# Patient Record
Sex: FEMALE | Race: BLACK OR AFRICAN AMERICAN | Employment: OTHER | URBAN - METROPOLITAN AREA
[De-identification: names, ages, dates, MRNs, and addresses within clinical notes are randomized per-mention and may not be internally consistent; named-entity substitution may affect disease eponyms.]

---

## 2018-02-23 ENCOUNTER — OFFICE VISIT (OUTPATIENT)
Dept: CARDIOLOGY CLINIC | Age: 73
End: 2018-02-23

## 2018-02-23 VITALS
WEIGHT: 167 LBS | BODY MASS INDEX: 29.59 KG/M2 | DIASTOLIC BLOOD PRESSURE: 100 MMHG | OXYGEN SATURATION: 98 % | SYSTOLIC BLOOD PRESSURE: 160 MMHG | HEART RATE: 84 BPM | HEIGHT: 63 IN

## 2018-02-23 DIAGNOSIS — R00.2 PALPITATIONS: ICD-10-CM

## 2018-02-23 DIAGNOSIS — R94.31 ABNORMAL EKG: Primary | ICD-10-CM

## 2018-02-23 DIAGNOSIS — E11.9 CONTROLLED TYPE 2 DIABETES MELLITUS WITHOUT COMPLICATION, WITHOUT LONG-TERM CURRENT USE OF INSULIN (HCC): ICD-10-CM

## 2018-02-23 RX ORDER — ATORVASTATIN CALCIUM 10 MG/1
10 TABLET, FILM COATED ORAL 3 TIMES WEEKLY
COMMUNITY
End: 2019-01-16 | Stop reason: ALTCHOICE

## 2018-02-23 RX ORDER — CHOLECALCIFEROL (VITAMIN D3) 125 MCG
2000 CAPSULE ORAL DAILY
COMMUNITY

## 2018-02-23 NOTE — PROGRESS NOTES
1. Have you been to the ER, urgent care clinic since your last visit? Hospitalized since your last visit? No     2. Have you seen or consulted any other health care providers outside of the [address] [place] [address] since your last visit? Include any pap smears or colon screening.  No

## 2018-02-23 NOTE — MR AVS SNAPSHOT
[address] [place] [address] [address] [address] [phone] Patient: Maggy Neville MRN: [id number] Visit Information Date & Time Provider Department Dept. Phone Encounter #  
 2/23/2018 10:00 AM Srinivas Scanlon MD [facility] [address] [id number] Upcoming Health Maintenance Date Due Hepatitis C Screening 1945 DTaP/Tdap/Td series (1 - Tdap) 9/14/1968 BREAST CANCER SCRN MAMMOGRAM 9/14/1997 FOBT Q 1 YEAR AGE 50-75 9/14/1997 ZOSTER VACCINE AGE 60> 7/14/2007 GLAUCOMA SCREENING Q2Y 9/14/2012 OSTEOPOROSIS SCREENING (DEXA) 9/14/2012 Pneumococcal 65+ Low/Medium Risk (1 of 2 - PCV13) 9/14/2012 MEDICARE YEARLY EXAM 9/14/2012 Influenza Age 5 to Adult 8/1/2017 Allergies as of 2/23/2018  Review Complete On: 2/5/2016 By: Prabhjot Piedra RN No Known Allergies Current Immunizations  Never Reviewed No immunizations on file. Not reviewed this visit You Were Diagnosed With   
  
 Codes Comments Palpitations    -  Primary ICD-10-CM: R00.2 ICD-9-CM: 785.1 Abnormal EKG     ICD-10-CM: R94.31 
ICD-9-CM: 794.31 Vitals BP Pulse Height(growth percentile) Weight(growth percentile) SpO2 BMI  
 (!) 160/100 84 5' 2.5\" (1.588 m) 167 lb (75.8 kg) 98% 30.06 kg/m2 OB Status Smoking Status Postmenopausal Unknown If Ever Smoked Vitals History BMI and BSA Data Body Mass Index Body Surface Area 30.06 kg/m 2 1.83 m 2 Your Updated Medication List  
  
   
This list is accurate as of 2/23/18 10:43 AM.  Always use your most recent med list.  
  
  
  
  
 LIPITOR 10 mg tablet Generic drug:  atorvastatin Take 10 mg by mouth daily. metFORMIN 500 mg tablet Commonly known as:  GLUCOPHAGE Take 250 mg by mouth two (2) times daily (with meals). VITAMIN D3 2,000 unit Tab Generic drug:  cholecalciferol (vitamin D3) Take 2,000 Units by mouth daily. We Performed the Following AMB POC EKG ROUTINE W/ 12 LEADS, INTER & REP [59198 CPT(R)] To-Do List   
 02/23/2018 ECHO:  2D ECHO COMPLETE ADULT (TTE) W OR WO CONTR Introducing [facility] & HE[facility] SERVICES! Brenda Robb introduces [employer] patient portal. Now you can access parts of your medical record, email your doctor's office, and request medication refills online. 1. In your internet browser, go to [email or web address] 2. Click on the First Time User? Click Here link in the Sign In box. You will see the New Member Sign Up page. 3. Enter your [employer] Access Code exactly as it appears below. You will not need to use this code after youve completed the sign-up process. If you do not sign up before the expiration date, you must request a new code. · [employer] Access Code: [email or web address] Expires: 5/24/2018  9:43 AM 
 
4. Enter the last four digits of your Social Security Number (xxxx) and Date of Birth (mm/dd/yyyy) as indicated and click Submit. You will be taken to the next sign-up page. 5. Create a [employer] ID. This will be your [employer] login ID and cannot be changed, so think of one that is secure and easy to remember. 6. Create a [employer] password. You can change your password at any time. 7. Enter your Password Reset Question and Answer. This can be used at a later time if you forget your password. 8. Enter your e-mail address. You will receive e-mail notification when new information is available in [address] 9. Click Sign Up. You can now view and download portions of your medical record. 10. Click the Download Summary menu link to download a portable copy of your medical information. If you have questions, please visit the Frequently Asked Questions section of the [employer] website. Remember, [employer] is NOT to be used for urgent needs. For medical emergencies, dial 911. Now available from your iPhone and Android! Please provide this summary of care documentation to your next provider. Your primary care clinician is listed as Halina Martin. If you have any questions after today's visit, please call [phone].

## 2018-02-23 NOTE — PROGRESS NOTES
HISTORY OF PRESENT ILLNESS  Nedra Monge is a 81 y.o. female. HPI    Patient presents for annual office visit. She was referred here by her PCP for evaluation of an abnormal EKG. She has a long-standing history of diabetes mellitus, type II, borderline hypertension and borderline dyslipidemia. She has been managed with oral agents for diabetes and was recently started on a low-dose statin, primarily because of her risk factors. She denies ever having any chest pain or shortness of breath. She does notice an occasional palpitation which feels like a skipped heartbeat now and again. She states she was diagnosed with PVCs in the past.  She tries to exercise at least 4-5 times a week doing different cardiovascular activities. She has never noted any exertional chest pain or shortness of breath. No major change in her activity tolerance. No dizziness or syncope. Past Medical History:   Diagnosis Date    Diabetes ([facility] [address]     Oral agents     Obesity, unspecified     Patient education: Exercise to stay healthy; physical activity Patient Education; Fats in the Diet; Good and bad recommendations for a healthy diet*: calories    Other specified cardiac dysrhythmias(427.89)     Palpitations     H/o PVCs in remote past    Undiagnosed cardiac murmurs      Current Outpatient Prescriptions   Medication Sig Dispense Refill    cholecalciferol, vitamin D3, (VITAMIN D3) 2,000 unit tab Take 2,000 Units by mouth daily.  atorvastatin (LIPITOR) 10 mg tablet Take 10 mg by mouth daily.  metFORMIN (GLUCOPHAGE) 500 mg tablet Take 250 mg by mouth two (2) times daily (with meals).        No Known Allergies     Social History   Substance Use Topics    Smoking status: Unknown If Ever Smoked    Smokeless tobacco: None    Alcohol use No     Family History   Problem Relation Age of Onset    Heart Disease Mother     Arthritis-osteo Mother     Cancer Father     Dementia Father     Heart Disease Father     Diabetes Sister     Heart Disease Sister     Diabetes Brother          Review of Systems   Constitutional: Negative for chills, fever and weight loss. HENT: Negative for nosebleeds. Eyes: Negative for blurred vision and double vision. Respiratory: Negative for cough, shortness of breath and wheezing. Cardiovascular: Positive for palpitations. Negative for chest pain, orthopnea, claudication, leg swelling and PND. Gastrointestinal: Negative for abdominal pain, heartburn, nausea and vomiting. Genitourinary: Negative for dysuria and hematuria. Musculoskeletal: Negative for falls and myalgias. Skin: Negative for rash. Neurological: Negative for dizziness, focal weakness and headaches. Endo/Heme/Allergies: Does not bruise/bleed easily. Psychiatric/Behavioral: Negative for substance abuse. Visit Vitals    BP (!) 160/100    Pulse 84    Ht 5' 2.5\" (1.588 m)    Wt 75.8 kg (167 lb)    SpO2 98%    BMI 30.06 kg/m2       Physical Exam   Constitutional: She is oriented to person, place, and time. She appears well-developed and well-nourished. HENT:   Head: Normocephalic and atraumatic. Eyes: Conjunctivae are normal.   Neck: Neck supple. No JVD present. Carotid bruit is not present. Cardiovascular: Normal rate, regular rhythm, S1 normal, S2 normal and normal pulses. Exam reveals no gallop. No murmur heard. Pulmonary/Chest: Effort normal and breath sounds normal. She has no wheezes. She has no rales. Abdominal: Soft. Bowel sounds are normal. There is no tenderness. Musculoskeletal: She exhibits no edema, tenderness or deformity. Neurological: She is alert and oriented to person, place, and time. Skin: Skin is warm and dry. Psychiatric: She has a normal mood and affect.  Her behavior is normal. Thought content normal.     EKG: Normal sinus rhythm, normal axis, borderline voltage criteria for LVH, normal QTc interval, borderline anteroseptal Q waves, cannot exclude prior infarct. No ST or T-wave abnormalities concerning for ischemia. No change compared to her previous EKG. ASSESSMENT and PLAN    Abnormal EKG. Patient does have borderline anteroseptal Q waves present on today's EKG and her prior EKG. This can represent a prior infarct, but often is related to a patient's body habitus. I have recommended an echocardiogram for further evaluation of her LV function and assess for any regional wall motion abnormalities. If this is unremarkable, no further cardiac workup is needed. Borderline hypertension. Patient has not taken any blood pressure medications. Her blood pressure is elevated today in the office, however, she states it is usually well controlled without medications. I have asked her to start checking this regularly. Diabetes mellitus, type II. Patient has been treated with oral agents. She states her hemoglobin A1c is always been less than 7. Borderline dyslipidemia. Patient had a lipid panel done in February 2018: Total cholesterol 209, HDL 93, LDL 98. These numbers are very reasonable, but given her long-standing diabetes, a statin is not unreasonable. Heart palpitations. These have been unchanged for several years. I suspect the patient is having either isolated PVCs or PACs. As well as her echocardiogram shows no significant structural heart disease, no further workup needed. Follow-up in 6 months, sooner if needed.

## 2018-03-02 ENCOUNTER — HOSPITAL ENCOUNTER (OUTPATIENT)
Dept: NON INVASIVE DIAGNOSTICS | Age: 73
Discharge: HOME | End: 2018-03-02
Attending: INTERNAL MEDICINE
Payer: MEDICARE

## 2018-03-02 DIAGNOSIS — R94.31 ABNORMAL EKG: ICD-10-CM

## 2018-03-02 DIAGNOSIS — R00.2 PALPITATIONS: ICD-10-CM

## 2018-03-02 PROCEDURE — 93306 TTE W/DOPPLER COMPLETE: CPT

## 2018-03-06 NOTE — PROGRESS NOTES
Per your last note \" Abnormal EKG. Patient does have borderline anteroseptal Q waves present on today's EKG and her prior EKG. This can represent a prior infarct, but often is related to a patient's body habitus. I have recommended an echocardiogram for further evaluation of her LV function and assess for any regional wall motion abnormalities. If this is unremarkable, no further cardiac workup is needed.

## 2018-03-12 ENCOUNTER — TELEPHONE (OUTPATIENT)
Dept: CARDIOLOGY CLINIC | Age: 73
End: 2018-03-12

## 2018-03-12 NOTE — TELEPHONE ENCOUNTER
Called patient. Verified  and full name. Informed about results per Dr Braxton Sauceda. Patient verbalized understanding and agreed with the plan of care. She is concerned for her BP.  Scheduled for her to come in and check BP along with comparing to her BP device

## 2018-03-12 NOTE — TELEPHONE ENCOUNTER
----- Message from Tiera Dasilva MD sent at 3/12/2018  9:43 AM EDT -----  Please let the patient know that her echocardiogram was unremarkable.  ----- Message -----     From: Deepak English RN     Sent: 3/6/2018   8:00 AM       To: Tiera Dasilva MD    Per your last note \" Abnormal EKG. Patient does have borderline anteroseptal Q waves present on today's EKG and her prior EKG. This can represent a prior infarct, but often is related to a patient's body habitus. I have recommended an echocardiogram for further evaluation of her LV function and assess for any regional wall motion abnormalities. If this is unremarkable, no further cardiac workup is needed.

## 2018-03-26 ENCOUNTER — CLINICAL SUPPORT (OUTPATIENT)
Dept: CARDIOLOGY CLINIC | Age: 73
End: 2018-03-26

## 2018-03-26 VITALS
BODY MASS INDEX: 29.59 KG/M2 | WEIGHT: 167 LBS | SYSTOLIC BLOOD PRESSURE: 130 MMHG | OXYGEN SATURATION: 98 % | DIASTOLIC BLOOD PRESSURE: 72 MMHG | HEART RATE: 68 BPM | HEIGHT: 63 IN

## 2018-03-26 DIAGNOSIS — R00.2 PALPITATIONS: Primary | ICD-10-CM

## 2018-03-26 NOTE — PROGRESS NOTES
Allison Rich is a 81 y.o. female that is here for a blood pressure check. Her current medications are listed below. Current Outpatient Prescriptions   Medication Sig    cholecalciferol, vitamin D3, (VITAMIN D3) 2,000 unit tab Take 2,000 Units by mouth daily.  metFORMIN (GLUCOPHAGE) 500 mg tablet Take 250 mg by mouth two (2) times daily (with meals).  atorvastatin (LIPITOR) 10 mg tablet Take 10 mg by mouth daily. No current facility-administered medications for this visit. Her   Visit Vitals    /72    Pulse 68    Ht 5' 2.58\" (1.59 m)    Wt 75.8 kg (167 lb)    SpO2 98%    BMI 29.98 kg/m2         Patient was seen in the office 2/23/2018. Patient had an high blood pressure reading of 160/100 at the time of the office visit. Patient brought her automated BP machine to appt today to compare readings. When taken with automated machine in office, her reading was 153/76. I continued to let Ms. Buck Gomez rest and proceeded to take her blood pressure readings manually. 1st reading 146 /74 HR 68  And second about 15 minutes later at 130/72. Patient denies cardiac symptoms other than palpitations. Patient states she has not started the atorvastatin 10 mg that was prescribed by her pcp due to the information she has read on the side effects. She states she much rather have repeat lipid labs before she starts medication. Discussed importance of low sodium in her daily diet. Patient indicated understanding to all that was discussed. Patient did state she feels that her reading in the office at her last visit was not an accurate one.

## 2018-06-19 ENCOUNTER — OFFICE VISIT (OUTPATIENT)
Dept: CARDIOLOGY CLINIC | Age: 73
End: 2018-06-19

## 2018-06-19 VITALS
HEART RATE: 89 BPM | WEIGHT: 156 LBS | SYSTOLIC BLOOD PRESSURE: 126 MMHG | BODY MASS INDEX: 28.71 KG/M2 | HEIGHT: 62 IN | DIASTOLIC BLOOD PRESSURE: 72 MMHG | OXYGEN SATURATION: 98 %

## 2018-06-19 DIAGNOSIS — E78.5 DYSLIPIDEMIA: ICD-10-CM

## 2018-06-19 DIAGNOSIS — R94.31 ABNORMAL EKG: Primary | ICD-10-CM

## 2018-06-19 DIAGNOSIS — R00.2 PALPITATIONS: ICD-10-CM

## 2018-06-19 NOTE — PROGRESS NOTES
HISTORY OF PRESENT ILLNESS  Scot Mcmahon is a 81 y.o. female. Palpitations    Pertinent negatives include no fever, no chest pain, no claudication, no orthopnea, no PND, no abdominal pain, no nausea, no vomiting, no headaches, no dizziness, no cough and no shortness of breath. Patient presents for a follow-up office visit. She was initially referred here by her PCP for evaluation of an abnormal EKG. She has a long-standing history of diabetes mellitus, type II, borderline hypertension and borderline dyslipidemia. She has been managed with oral agents for diabetes and was recently started on a low-dose statin, primarily because of her risk factors. She does notice an occasional palpitation which feels like a skipped heartbeat now and again. She states she was diagnosed with PVCs in the past.  Patient subsequently underwent an echocardiogram in March 2018 which was essentially normal for her age. EF 86%, grade 1 diastolic dysfunction, mild left atrial enlargement with mildly elevated RV pressures. She was last seen in the office approximately 4 months ago. Since last visit, she continues to have heart palpitations which only last for a second or 2. She notices the sensation of a skipped heartbeat. This appears to be worse under stress or after eating. She has been diagnosed with a hiatal hernia in the past.  She denies any palpitations with exertion. No dyspnea on exertion or chest pain or pressure. She requests stopping her atorvastatin. She states she also recently stopped her metformin at the recommendation of her PCP. Past Medical History:   Diagnosis Date    Diabetes ([facility] [address]     Oral agents     Obesity, unspecified     Patient education: Exercise to stay healthy; physical activity Patient Education;  Fats in the Diet; Good and bad recommendations for a healthy diet*: calories    Other specified cardiac dysrhythmias(427.89)     Palpitations     H/o PVCs in remote past    Undiagnosed cardiac murmurs      Current Outpatient Prescriptions   Medication Sig Dispense Refill    cholecalciferol, vitamin D3, (VITAMIN D3) 2,000 unit tab Take 2,000 Units by mouth daily.  atorvastatin (LIPITOR) 10 mg tablet Take 10 mg by mouth Three (3) times a week. No Known Allergies     Social History   Substance Use Topics    Smoking status: Unknown If Ever Smoked    Smokeless tobacco: None    Alcohol use No     Family History   Problem Relation Age of Onset    Heart Disease Mother     Arthritis-osteo Mother     Cancer Father     Dementia Father     Heart Disease Father     Diabetes Sister     Heart Disease Sister     Diabetes Brother          Review of Systems   Constitutional: Negative for chills, fever and weight loss. HENT: Negative for nosebleeds. Eyes: Negative for blurred vision and double vision. Respiratory: Negative for cough, shortness of breath and wheezing. Cardiovascular: Positive for palpitations. Negative for chest pain, orthopnea, claudication, leg swelling and PND. Gastrointestinal: Negative for abdominal pain, heartburn, nausea and vomiting. Genitourinary: Negative for dysuria and hematuria. Musculoskeletal: Negative for falls and myalgias. Skin: Negative for rash. Neurological: Negative for dizziness, focal weakness and headaches. Endo/Heme/Allergies: Does not bruise/bleed easily. Psychiatric/Behavioral: Negative for substance abuse. Visit Vitals    /72    Pulse 89    Ht 5' 2\" (1.575 m)    Wt 70.8 kg (156 lb)    SpO2 98%    BMI 28.53 kg/m2       Physical Exam   Constitutional: She is oriented to person, place, and time. She appears well-developed and well-nourished. HENT:   Head: Normocephalic and atraumatic. Eyes: Conjunctivae are normal.   Neck: Neck supple. No JVD present. Carotid bruit is not present. Cardiovascular: Normal rate, regular rhythm, S1 normal, S2 normal and normal pulses. Exam reveals no gallop. No murmur heard. Pulmonary/Chest: Effort normal and breath sounds normal. She has no wheezes. She has no rales. Abdominal: Soft. Bowel sounds are normal. There is no tenderness. Musculoskeletal: She exhibits no edema, tenderness or deformity. Neurological: She is alert and oriented to person, place, and time. Skin: Skin is warm and dry. Psychiatric: She has a normal mood and affect. Her behavior is normal. Thought content normal.       ASSESSMENT and PLAN    Abnormal EKG. Patient did have borderline anteroseptal Q waves present on her two prior EKGs. She subsequently underwent an echocardiogram in March 2018 which showed normal LV size and systolic function, EF 30%, no regional wall motion abnormalities. History of diabetes mellitus, type II. This has been treated with metformin, however this was recently stopped by her PCP. She is now managing with lifestyle modifications. Her last hemoglobin A1c was less than 6. Borderline dyslipidemia. Patient had a lipid panel done in April 2018 which showed a total cholesterol 207, HDL 99, LDL 95. The patient requests to stop her statin which is not unreasonable given these numbers. Once she is off medication, would have her lipids rechecked in 3 months. Heart palpitations. These have been unchanged for several years. I still suspect the patient is having either isolated PVCs or PACs. Follow-up in 6 months, sooner if needed.

## 2018-06-19 NOTE — MR AVS SNAPSHOT
[address] [address] [address] [id number] [phone] Patient: Jacob Steele MRN: [id number] Visit Information Date & Time Provider Department Dept. Phone Encounter #  
 6/19/2018  1:40 PM Susan Segura MD [facility] [address] [id number] Your Appointments 12/21/2018 10:00 AM  
Follow Up with Susan Segura MD  
[facility] Appt Note: 6 mo f/u  
 [address] [address] [address] [address] [phone] [address] [place] [address] Upcoming Health Maintenance Date Due Hepatitis C Screening 1945 FOOT EXAM Q1 9/14/1957 MICROALBUMIN Q1 9/14/1957 EYE EXAM RETINAL OR DILATED Q1 9/14/1957 DTaP/Tdap/Td series (1 - Tdap) 9/14/1968 BREAST CANCER SCRN MAMMOGRAM 9/14/1997 FOBT Q 1 YEAR AGE 50-75 9/14/1997 ZOSTER VACCINE AGE 60> 7/14/2007 GLAUCOMA SCREENING Q2Y 9/14/2012 Bone Densitometry (Dexa) Screening 9/14/2012 Pneumococcal 65+ Low/Medium Risk (1 of 2 - PCV13) 9/14/2012 MEDICARE YEARLY EXAM 3/14/2018 Influenza Age 5 to Adult 8/1/2018 HEMOGLOBIN A1C Q6M 10/6/2018 LIPID PANEL Q1 4/6/2019 Allergies as of 6/19/2018  Review Complete On: 2/23/2018 By: Susan Segura MD  
 No Known Allergies Current Immunizations  Never Reviewed No immunizations on file. Not reviewed this visit Vitals BP Pulse Height(growth percentile) Weight(growth percentile) SpO2 BMI  
 126/72 89 5' 2\" (1.575 m) 156 lb (70.8 kg) 98% 28.53 kg/m2 OB Status Smoking Status Postmenopausal Unknown If Ever Smoked Vitals History BMI and BSA Data Body Mass Index Body Surface Area 28.53 kg/m 2 1.76 m 2 Your Updated Medication List  
  
   
 This list is accurate as of 6/19/18  3:12 PM.  Always use your most recent med list.  
  
  
  
  
 LIPITOR 10 mg tablet Generic drug:  atorvastatin Take 10 mg by mouth Three (3) times a week. VITAMIN D3 2,000 unit Tab Generic drug:  cholecalciferol (vitamin D3) Take 2,000 Units by mouth daily. Introducing [facility]! Sheron Veras introduces [employer] patient portal. Now you can access parts of your medical record, email your doctor's office, and request medication refills online. 1. In your internet browser, go to [email or web address]. [email or web address] 2. Click on the First Time User? Click Here link in the Sign In box. You will see the New Member Sign Up page. 3. Enter your [employer] Access Code exactly as it appears below. You will not need to use this code after youve completed the sign-up process. If you do not sign up before the expiration date, you must request a new code. · [employer] Access Code: [facility] Expires: 9/16/2018 10:03 AM 
 
4. Enter the last four digits of your Social Security Number (xxxx) and Date of Birth (mm/dd/yyyy) as indicated and click Submit. You will be taken to the next sign-up page. 5. Create a [employer] ID. This will be your [employer] login ID and cannot be changed, so think of one that is secure and easy to remember. 6. Create a [employer] password. You can change your password at any time. 7. Enter your Password Reset Question and Answer. This can be used at a later time if you forget your password. 8. Enter your e-mail address. You will receive e-mail notification when new information is available in [address] 9. Click Sign Up. You can now view and download portions of your medical record. 10. Click the Download Summary menu link to download a portable copy of your medical information. If you have questions, please visit the Frequently Asked Questions section of the [employer] website.  Remember, [employer] is NOT to be used for urgent needs. For medical emergencies, dial 911. Now available from your iPhone and Android! Please provide this summary of care documentation to your next provider. Your primary care clinician is listed as Jose De Jesus Bishop. If you have any questions after today's visit, please call [phone].

## 2018-06-19 NOTE — PROGRESS NOTES
1. Have you been to the ER, urgent care clinic since your last visit? Hospitalized since your last visit? no    2. Have you seen or consulted any other health care providers outside of the [address] [place] [address] since your last visit? Include any pap smears or colon screening.   No

## 2019-01-16 ENCOUNTER — OFFICE VISIT (OUTPATIENT)
Dept: CARDIOLOGY CLINIC | Age: 74
End: 2019-01-16

## 2019-01-16 VITALS
DIASTOLIC BLOOD PRESSURE: 80 MMHG | WEIGHT: 157 LBS | HEIGHT: 62 IN | BODY MASS INDEX: 28.89 KG/M2 | OXYGEN SATURATION: 98 % | HEART RATE: 70 BPM | SYSTOLIC BLOOD PRESSURE: 140 MMHG

## 2019-01-16 DIAGNOSIS — E78.5 DYSLIPIDEMIA: ICD-10-CM

## 2019-01-16 DIAGNOSIS — R00.2 PALPITATIONS: Primary | ICD-10-CM

## 2019-01-16 RX ORDER — BISMUTH SUBSALICYLATE 262 MG
1 TABLET,CHEWABLE ORAL DAILY
COMMUNITY

## 2019-01-16 NOTE — PROGRESS NOTES
Sonny Flynn presents today for   Chief Complaint   Patient presents with    Hypertension     6 month follow up        Sonny Flynn preferred language for health care discussion is english/other. Is someone accompanying this pt? No     Is the patient using any DME equipment during OV? No     Depression Screening:  No flowsheet data found. Learning Assessment:  Learning Assessment 2/23/2018   PRIMARY LEARNER Patient   PRIMARY LANGUAGE ENGLISH   LEARNER PREFERENCE PRIMARY DEMONSTRATION   ANSWERED BY Patient   RELATIONSHIP SELF       Abuse Screening:  No flowsheet data found. Fall Risk  No flowsheet data found. Pt currently taking Anticoagulant therapy? No     Coordination of Care:  1. Have you been to the ER, urgent care clinic since your last visit? Hospitalized since your last visit? No     2. Have you seen or consulted any other health care providers outside of the [address] [place] [address] since your last visit? Include any pap smears or colon screening.  No

## 2019-01-16 NOTE — PROGRESS NOTES
HISTORY OF PRESENT ILLNESS  Jacob Steele is a 72 y.o. female. Palpitations    Pertinent negatives include no fever, no claudication, no orthopnea, no PND, no nausea, no vomiting, no dizziness and no cough. Patient presents for a follow-up office visit. She was initially referred here by her PCP for evaluation of an abnormal EKG. She has a long-standing history of diabetes mellitus, type II, borderline hypertension and borderline dyslipidemia. She has been managed with oral agents for diabetes and was recently started on a low-dose statin, primarily because of her risk factors. She does notice an occasional palpitation which feels like a skipped heartbeat now and again. She states she was diagnosed with PVCs in the past.  Patient subsequently underwent an echocardiogram in March 2018 which was essentially normal for her age. EF 61%, grade 1 diastolic dysfunction, mild left atrial enlargement with mildly elevated RV pressures. She was last seen in the office approximately 6-7 months ago. Since last visit, she continues to have heart palpitations which only last for a few seconds at most in duration. She states her frequency has not significant change from last visit. She denies any new chest pain, shortness of breath, leg swelling, orthopnea or PND. No major change in her activity level. Past Medical History:   Diagnosis Date    Diabetes ([facility] [address]     Oral agents     Obesity, unspecified     Patient education: Exercise to stay healthy; physical activity Patient Education; Fats in the Diet; Good and bad recommendations for a healthy diet*: calories    Other specified cardiac dysrhythmias(427.89)     Palpitations     H/o PVCs in remote past    Undiagnosed cardiac murmurs      Current Outpatient Medications   Medication Sig Dispense Refill    multivitamin (ONE A DAY) tablet Take 1 Tab by mouth daily.       cholecalciferol, vitamin D3, (VITAMIN D3) 2,000 unit tab Take 2,000 Units by mouth daily. No Known Allergies     Social History     Tobacco Use    Smoking status: Unknown If Ever Smoked   Substance Use Topics    Alcohol use: No    Drug use: Not on file     Family History   Problem Relation Age of Onset    Heart Disease Mother     Arthritis-osteo Mother    Myna Diaz Father     Dementia Father     Heart Disease Father     Diabetes Sister     Heart Disease Sister     Diabetes Brother          Review of Systems   Constitutional: Negative for chills, fever and weight loss. HENT: Negative for nosebleeds. Eyes: Negative for blurred vision and double vision. Respiratory: Negative for cough and wheezing. Cardiovascular: Positive for palpitations. Negative for orthopnea, claudication, leg swelling and PND. Gastrointestinal: Negative for heartburn, nausea and vomiting. Genitourinary: Negative for dysuria and hematuria. Musculoskeletal: Negative for falls and myalgias. Skin: Negative for rash. Neurological: Negative for dizziness and focal weakness. Endo/Heme/Allergies: Does not bruise/bleed easily. Psychiatric/Behavioral: Negative for substance abuse. Visit Vitals  /80   Pulse 70   Ht 5' 2\" (1.575 m)   Wt 71.2 kg (157 lb)   SpO2 98%   BMI 28.72 kg/m²       Physical Exam   Constitutional: She is oriented to person, place, and time. She appears well-developed and well-nourished. HENT:   Head: Normocephalic and atraumatic. Eyes: Conjunctivae are normal.   Neck: Neck supple. No JVD present. Carotid bruit is not present. Cardiovascular: Normal rate, regular rhythm, S1 normal, S2 normal and normal pulses. Exam reveals no gallop. No murmur heard. Pulmonary/Chest: Effort normal and breath sounds normal. She has no wheezes. She has no rales. Abdominal: Soft. Bowel sounds are normal. There is no tenderness. Musculoskeletal: She exhibits no edema, tenderness or deformity. Neurological: She is alert and oriented to person, place, and time.    Skin: Skin is warm and dry. Psychiatric: She has a normal mood and affect. Her behavior is normal. Thought content normal.     EKG: Normal sinus rhythm, normal axis, normal QTc interval, no ST or T wave no mass concerning for ischemia. Normal tracing. ASSESSMENT and PLAN    Heart palpitations. These have been unchanged in severity or frequency for several years. I still suspect the patient is having either isolated PVCs or PACs. No further workup or treatment needed. Borderline dyslipidemia. Patient had a lipid panel done in April 2018 which showed a total cholesterol 207, HDL 99, LDL 95. She is no longer taking her statin which I think is reasonable.     Patient can follow-up annually, sooner if needed

## 2021-11-18 ENCOUNTER — OFFICE VISIT (OUTPATIENT)
Dept: CARDIOLOGY CLINIC | Age: 76
End: 2021-11-18
Payer: MEDICARE

## 2021-11-18 VITALS
WEIGHT: 162 LBS | OXYGEN SATURATION: 98 % | BODY MASS INDEX: 29.63 KG/M2 | HEART RATE: 72 BPM | SYSTOLIC BLOOD PRESSURE: 148 MMHG | DIASTOLIC BLOOD PRESSURE: 84 MMHG

## 2021-11-18 DIAGNOSIS — R03.0 BORDERLINE HYPERTENSION: ICD-10-CM

## 2021-11-18 DIAGNOSIS — R00.2 PALPITATIONS: Primary | ICD-10-CM

## 2021-11-18 PROCEDURE — G8400 PT W/DXA NO RESULTS DOC: HCPCS | Performed by: INTERNAL MEDICINE

## 2021-11-18 PROCEDURE — 93000 ELECTROCARDIOGRAM COMPLETE: CPT | Performed by: INTERNAL MEDICINE

## 2021-11-18 PROCEDURE — G8510 SCR DEP NEG, NO PLAN REQD: HCPCS | Performed by: INTERNAL MEDICINE

## 2021-11-18 PROCEDURE — 99214 OFFICE O/P EST MOD 30 MIN: CPT | Performed by: INTERNAL MEDICINE

## 2021-11-18 PROCEDURE — G8536 NO DOC ELDER MAL SCRN: HCPCS | Performed by: INTERNAL MEDICINE

## 2021-11-18 PROCEDURE — G8427 DOCREV CUR MEDS BY ELIG CLIN: HCPCS | Performed by: INTERNAL MEDICINE

## 2021-11-18 PROCEDURE — G8419 CALC BMI OUT NRM PARAM NOF/U: HCPCS | Performed by: INTERNAL MEDICINE

## 2021-11-18 PROCEDURE — 3017F COLORECTAL CA SCREEN DOC REV: CPT | Performed by: INTERNAL MEDICINE

## 2021-11-18 PROCEDURE — 1101F PT FALLS ASSESS-DOCD LE1/YR: CPT | Performed by: INTERNAL MEDICINE

## 2021-11-18 PROCEDURE — 1090F PRES/ABSN URINE INCON ASSESS: CPT | Performed by: INTERNAL MEDICINE

## 2021-11-18 NOTE — PROGRESS NOTES
HISTORY OF PRESENT ILLNESS  Ed Patterson is a 78 y.o. female. Palpitations   Pertinent negatives include no fever, no claudication, no orthopnea, no PND, no nausea, no vomiting, no dizziness and no cough. Follow-up        Patient presents for a long overdue follow-up office visit. She was initially referred here by her PCP for evaluation of an abnormal EKG. She has a long-standing history of diabetes mellitus, type II, borderline hypertension and borderline dyslipidemia. She does notice an occasional palpitation which feels like a skipped heartbeat now and again. She states she was diagnosed with PVCs in the past.  Patient subsequently underwent an echocardiogram in March 2018 which was essentially normal for her age. EF 66%, grade 1 diastolic dysfunction, mild left atrial enlargement with mildly elevated RV pressures. The patient has not been seen in the office in nearly 3 years. She returns today at the request of her PCP after recently being seen for a routine physical.  According to the patient, her initial blood pressure reading at her PCPs office was 160/70 at which then improved to 140/70. She has never been treated with high blood pressure medications. She states that she will check her blood pressure at home once or twice a week and her readings are typically in the 130s over 70s. She still tries to walk 30 minutes most days of the week. She has not noted any major change in her activity tolerance. No new chest pain or shortness of breath. She does have sporadic heart palpitations which she describes as a skipped heartbeat only lasting for a second or 2 in duration. This is not changed in frequency or severity over the past few years. Past Medical History:   Diagnosis Date    Diabetes ([facility] [address]     Oral agents     Obesity, unspecified     Patient education: Exercise to stay healthy; physical activity Patient Education;  Fats in the Diet; Good and bad recommendations for a healthy diet*: calories    Other specified cardiac dysrhythmias(427.89)     Palpitations     H/o PVCs in remote past    Undiagnosed cardiac murmurs      Current Outpatient Medications   Medication Sig Dispense Refill    multivitamin (ONE A DAY) tablet Take 1 Tab by mouth daily.  cholecalciferol, vitamin D3, (VITAMIN D3) 2,000 unit tab Take 2,000 Units by mouth daily. No Known Allergies     Social History     Tobacco Use    Smoking status: Unknown If Ever Smoked    Smokeless tobacco: Not on file   Substance Use Topics    Alcohol use: No    Drug use: Not on file     Family History   Problem Relation Age of Onset    Heart Disease Mother     Arthritis-osteo Mother    Christelle Riser Father     Dementia Father     Heart Disease Father     Diabetes Sister     Heart Disease Sister     Diabetes Brother          Review of Systems   Constitutional: Negative for chills, fever and weight loss. HENT: Negative for nosebleeds. Eyes: Negative for blurred vision and double vision. Respiratory: Negative for cough and wheezing. Cardiovascular: Positive for palpitations. Negative for orthopnea, claudication, leg swelling and PND. Gastrointestinal: Negative for heartburn, nausea and vomiting. Genitourinary: Negative for dysuria and hematuria. Musculoskeletal: Negative for falls and myalgias. Skin: Negative for rash. Neurological: Negative for dizziness and focal weakness. Endo/Heme/Allergies: Does not bruise/bleed easily. Psychiatric/Behavioral: Negative for substance abuse. Visit Vitals  BP (!) 148/84   Pulse 72   Wt 73.5 kg (162 lb)   SpO2 98%   BMI 29.63 kg/m²       Physical Exam  Constitutional:       Appearance: She is well-developed. HENT:      Head: Normocephalic and atraumatic. Eyes:      Conjunctiva/sclera: Conjunctivae normal.   Neck:      Vascular: No carotid bruit or JVD. Cardiovascular:      Rate and Rhythm: Normal rate and regular rhythm. Pulses: Normal pulses. Heart sounds: S1 normal and S2 normal. No murmur heard. No gallop. Pulmonary:      Effort: Pulmonary effort is normal.      Breath sounds: Normal breath sounds. No wheezing or rales. Abdominal:      General: Bowel sounds are normal.      Palpations: Abdomen is soft. Tenderness: There is no abdominal tenderness. Musculoskeletal:         General: No swelling, tenderness or deformity. Cervical back: Neck supple. Skin:     General: Skin is warm and dry. Neurological:      General: No focal deficit present. Mental Status: She is alert and oriented to person, place, and time. Psychiatric:         Behavior: Behavior normal.         Thought Content: Thought content normal.       EKG: Normal sinus rhythm, borderline anteroseptal Q waves, normal axis, normal QTc interval, no ST or T wave no mass concerning for ischemia. No change compared to the previous EKGs. ASSESSMENT and PLAN    Heart palpitations. These have been unchanged in severity or frequency for several years. I still suspect the patient is having either isolated PVCs or PACs. No further workup or treatment needed. Borderline hypertension. Patient's blood pressure is minimally elevated today in the office. She states it was even more elevated at her PCPs office recently. I have recommend she start checking his regular at home adhere to a strict low-sodium diet. If her blood pressure remains elevated, she may benefit from starting a thiazide diuretic. History of abnormal EKG. I suspect this is more likely due to lead positioning. She has had intermittent anteroseptal Q waves on previous tracings. She otherwise has never had any significant EKG changes. Given her normal echocardiogram in the past, I do not suspect she has had a prior myocardial infarction. Patient can follow-up every other year, sooner if needed.

## 2022-03-19 PROBLEM — R03.0 BORDERLINE HYPERTENSION: Status: ACTIVE | Noted: 2021-11-18

## 2023-04-04 ENCOUNTER — OFFICE VISIT (OUTPATIENT)
Age: 78
End: 2023-04-04
Payer: MEDICARE

## 2023-04-04 VITALS
HEART RATE: 68 BPM | WEIGHT: 146 LBS | OXYGEN SATURATION: 99 % | SYSTOLIC BLOOD PRESSURE: 142 MMHG | BODY MASS INDEX: 26.87 KG/M2 | DIASTOLIC BLOOD PRESSURE: 68 MMHG | HEIGHT: 62 IN

## 2023-04-04 DIAGNOSIS — I10 PRIMARY HYPERTENSION: Primary | ICD-10-CM

## 2023-04-04 DIAGNOSIS — R00.2 PALPITATIONS: ICD-10-CM

## 2023-04-04 PROBLEM — E66.3 OVERWEIGHT: Status: ACTIVE | Noted: 2023-04-04

## 2023-04-04 PROBLEM — L98.9 DISORDER OF SKIN OR SUBCUTANEOUS TISSUE: Status: ACTIVE | Noted: 2023-04-04

## 2023-04-04 PROBLEM — E55.9 VITAMIN D DEFICIENCY: Status: ACTIVE | Noted: 2023-04-04

## 2023-04-04 PROBLEM — R53.1 WEAKNESS: Status: ACTIVE | Noted: 2023-04-04

## 2023-04-04 PROBLEM — B02.9 HERPES ZOSTER WITHOUT COMPLICATION: Status: ACTIVE | Noted: 2023-04-04

## 2023-04-04 PROBLEM — K21.9 GASTRO-ESOPHAGEAL REFLUX DISEASE WITHOUT ESOPHAGITIS: Status: ACTIVE | Noted: 2023-04-04

## 2023-04-04 PROBLEM — D53.9 NUTRITIONAL ANEMIA: Status: ACTIVE | Noted: 2023-04-04

## 2023-04-04 PROBLEM — I49.8 OTHER SPECIFIED CARDIAC DYSRHYTHMIAS(427.89): Status: ACTIVE | Noted: 2023-04-04

## 2023-04-04 PROBLEM — E78.5 HYPERLIPIDEMIA: Status: ACTIVE | Noted: 2023-04-04

## 2023-04-04 PROBLEM — M25.50 PAIN IN JOINT: Status: ACTIVE | Noted: 2023-04-04

## 2023-04-04 PROBLEM — K44.9 DIAPHRAGMATIC HERNIA: Status: ACTIVE | Noted: 2023-04-04

## 2023-04-04 PROCEDURE — 1123F ACP DISCUSS/DSCN MKR DOCD: CPT | Performed by: INTERNAL MEDICINE

## 2023-04-04 PROCEDURE — 3078F DIAST BP <80 MM HG: CPT | Performed by: INTERNAL MEDICINE

## 2023-04-04 PROCEDURE — 99213 OFFICE O/P EST LOW 20 MIN: CPT | Performed by: INTERNAL MEDICINE

## 2023-04-04 PROCEDURE — G8427 DOCREV CUR MEDS BY ELIG CLIN: HCPCS | Performed by: INTERNAL MEDICINE

## 2023-04-04 PROCEDURE — 1090F PRES/ABSN URINE INCON ASSESS: CPT | Performed by: INTERNAL MEDICINE

## 2023-04-04 PROCEDURE — 93000 ELECTROCARDIOGRAM COMPLETE: CPT | Performed by: INTERNAL MEDICINE

## 2023-04-04 PROCEDURE — 3017F COLORECTAL CA SCREEN DOC REV: CPT | Performed by: INTERNAL MEDICINE

## 2023-04-04 PROCEDURE — 3077F SYST BP >= 140 MM HG: CPT | Performed by: INTERNAL MEDICINE

## 2023-04-04 PROCEDURE — G8400 PT W/DXA NO RESULTS DOC: HCPCS | Performed by: INTERNAL MEDICINE

## 2023-04-04 PROCEDURE — G8417 CALC BMI ABV UP PARAM F/U: HCPCS | Performed by: INTERNAL MEDICINE

## 2023-04-04 PROCEDURE — 4004F PT TOBACCO SCREEN RCVD TLK: CPT | Performed by: INTERNAL MEDICINE

## 2023-04-04 RX ORDER — HYDROCHLOROTHIAZIDE 25 MG/1
25 TABLET ORAL EVERY MORNING
Qty: 30 TABLET | Refills: 6 | Status: SHIPPED | OUTPATIENT
Start: 2023-04-04

## 2023-04-04 ASSESSMENT — ENCOUNTER SYMPTOMS
ABDOMINAL PAIN: 0
SORE THROAT: 0
NAUSEA: 0
COUGH: 0
VOMITING: 0
SHORTNESS OF BREATH: 0
ABDOMINAL DISTENTION: 0

## 2023-04-04 ASSESSMENT — ANXIETY QUESTIONNAIRES
1. FEELING NERVOUS, ANXIOUS, OR ON EDGE: 0
7. FEELING AFRAID AS IF SOMETHING AWFUL MIGHT HAPPEN: 0
GAD7 TOTAL SCORE: 0
3. WORRYING TOO MUCH ABOUT DIFFERENT THINGS: 0
5. BEING SO RESTLESS THAT IT IS HARD TO SIT STILL: 0
6. BECOMING EASILY ANNOYED OR IRRITABLE: 0
2. NOT BEING ABLE TO STOP OR CONTROL WORRYING: 0
4. TROUBLE RELAXING: 0

## 2023-04-04 ASSESSMENT — PATIENT HEALTH QUESTIONNAIRE - PHQ9
SUM OF ALL RESPONSES TO PHQ QUESTIONS 1-9: 0
SUM OF ALL RESPONSES TO PHQ QUESTIONS 1-9: 0
1. LITTLE INTEREST OR PLEASURE IN DOING THINGS: 0
2. FEELING DOWN, DEPRESSED OR HOPELESS: 0
SUM OF ALL RESPONSES TO PHQ9 QUESTIONS 1 & 2: 0
SUM OF ALL RESPONSES TO PHQ QUESTIONS 1-9: 0
SUM OF ALL RESPONSES TO PHQ QUESTIONS 1-9: 0

## 2023-04-04 NOTE — PROGRESS NOTES
Roberth Kaur presents today for   Chief Complaint   Patient presents with    Follow-up     Add on       Roberth Kaur preferred language for health care discussion is english/other. Is someone accompanying this pt? no    Is the patient using any DME equipment during OV? no    Depression Screening:  Depression: Not at risk    PHQ-2 Score: 0        Learning Assessment:  Who is the primary learner? Patient    What is the preferred language for health care of the primary learner? ENGLISH    How does the primary learner prefer to learn new concepts? DEMONSTRATION    Answered By patient    Relationship to Learner SELF           Pt currently taking Anticoagulant therapy? no    Pt currently taking Antiplatelet therapy ? no      Coordination of Care:  1. Have you been to the ER, urgent care clinic since your last visit? Hospitalized since your last visit? no    2. Have you seen or consulted any other health care providers outside of the [address] [place] [address] since your last visit? Include any pap smears or colon screening.  no
Abdomen is soft. Tenderness: There is no abdominal tenderness. Musculoskeletal:         General: No swelling or deformity. Skin:     General: Skin is warm and dry. Findings: No rash. Neurological:      General: No focal deficit present. Mental Status: She is alert and oriented to person, place, and time. Psychiatric:         Mood and Affect: Mood normal.         Behavior: Behavior normal.       EKG: Normal sinus rhythm, normal axis, normal Qtc interval, no ST/T wave abnormalities concerning for ischemia. Assessment / Plan:   Hypertension. Likely essential.  I suspect at this point she will be able to control this with minimal therapy. I recommended starting HCTZ 12.5 mg once daily. When she has been on this medication for 2 weeks, I would like to check a Chem-7 panel. If her blood pressure remains elevated on single therapy, it is not reasonable to add the losartan to the thiazide diuretic. She has been adhering to a low-sodium diet and has been losing weight as well. Heart palpitations. No significant change over the past few years. No need for additional work-up or treatment. Patient can follow-up in November of this year as previously scheduled, sooner if needed.     Jayna Mcadams MD

## 2023-11-06 ENCOUNTER — OFFICE VISIT (OUTPATIENT)
Age: 78
End: 2023-11-06
Payer: MEDICARE

## 2023-11-06 VITALS
SYSTOLIC BLOOD PRESSURE: 148 MMHG | WEIGHT: 153 LBS | DIASTOLIC BLOOD PRESSURE: 82 MMHG | BODY MASS INDEX: 28.16 KG/M2 | OXYGEN SATURATION: 100 % | HEIGHT: 62 IN | HEART RATE: 68 BPM

## 2023-11-06 DIAGNOSIS — R00.2 PALPITATIONS: ICD-10-CM

## 2023-11-06 DIAGNOSIS — R01.1 MURMUR: Primary | ICD-10-CM

## 2023-11-06 DIAGNOSIS — I10 PRIMARY HYPERTENSION: ICD-10-CM

## 2023-11-06 PROCEDURE — 4004F PT TOBACCO SCREEN RCVD TLK: CPT | Performed by: INTERNAL MEDICINE

## 2023-11-06 PROCEDURE — 1090F PRES/ABSN URINE INCON ASSESS: CPT | Performed by: INTERNAL MEDICINE

## 2023-11-06 PROCEDURE — G8400 PT W/DXA NO RESULTS DOC: HCPCS | Performed by: INTERNAL MEDICINE

## 2023-11-06 PROCEDURE — 93000 ELECTROCARDIOGRAM COMPLETE: CPT | Performed by: INTERNAL MEDICINE

## 2023-11-06 PROCEDURE — 1123F ACP DISCUSS/DSCN MKR DOCD: CPT | Performed by: INTERNAL MEDICINE

## 2023-11-06 PROCEDURE — 99213 OFFICE O/P EST LOW 20 MIN: CPT | Performed by: INTERNAL MEDICINE

## 2023-11-06 PROCEDURE — G8427 DOCREV CUR MEDS BY ELIG CLIN: HCPCS | Performed by: INTERNAL MEDICINE

## 2023-11-06 PROCEDURE — G8417 CALC BMI ABV UP PARAM F/U: HCPCS | Performed by: INTERNAL MEDICINE

## 2023-11-06 PROCEDURE — 3079F DIAST BP 80-89 MM HG: CPT | Performed by: INTERNAL MEDICINE

## 2023-11-06 PROCEDURE — G8484 FLU IMMUNIZE NO ADMIN: HCPCS | Performed by: INTERNAL MEDICINE

## 2023-11-06 PROCEDURE — 3077F SYST BP >= 140 MM HG: CPT | Performed by: INTERNAL MEDICINE

## 2023-11-06 RX ORDER — CHOLECALCIFEROL (VITAMIN D3) 25 MCG
1 TABLET,CHEWABLE ORAL
COMMUNITY

## 2023-11-06 ASSESSMENT — ENCOUNTER SYMPTOMS
ABDOMINAL PAIN: 0
SORE THROAT: 0
SHORTNESS OF BREATH: 0
VOMITING: 0
NAUSEA: 0
ABDOMINAL DISTENTION: 0
COUGH: 0

## 2023-11-06 ASSESSMENT — ANXIETY QUESTIONNAIRES
7. FEELING AFRAID AS IF SOMETHING AWFUL MIGHT HAPPEN: 0
4. TROUBLE RELAXING: 0
2. NOT BEING ABLE TO STOP OR CONTROL WORRYING: 0
3. WORRYING TOO MUCH ABOUT DIFFERENT THINGS: 0
1. FEELING NERVOUS, ANXIOUS, OR ON EDGE: 0
GAD7 TOTAL SCORE: 0
5. BEING SO RESTLESS THAT IT IS HARD TO SIT STILL: 0
6. BECOMING EASILY ANNOYED OR IRRITABLE: 0

## 2023-11-06 ASSESSMENT — PATIENT HEALTH QUESTIONNAIRE - PHQ9
SUM OF ALL RESPONSES TO PHQ QUESTIONS 1-9: 0
2. FEELING DOWN, DEPRESSED OR HOPELESS: 0
SUM OF ALL RESPONSES TO PHQ9 QUESTIONS 1 & 2: 0
1. LITTLE INTEREST OR PLEASURE IN DOING THINGS: 0
SUM OF ALL RESPONSES TO PHQ QUESTIONS 1-9: 0

## 2024-10-21 ENCOUNTER — HOSPITAL ENCOUNTER (EMERGENCY)
Facility: HOSPITAL | Age: 79
Discharge: HOME | End: 2024-10-21
Payer: MEDICARE

## 2024-10-21 ENCOUNTER — APPOINTMENT (OUTPATIENT)
Facility: HOSPITAL | Age: 79
End: 2024-10-21
Payer: MEDICARE

## 2024-10-21 VITALS
BODY MASS INDEX: 27.6 KG/M2 | DIASTOLIC BLOOD PRESSURE: 68 MMHG | HEIGHT: 62 IN | RESPIRATION RATE: 17 BRPM | TEMPERATURE: 98.8 F | HEART RATE: 74 BPM | WEIGHT: 150 LBS | OXYGEN SATURATION: 99 % | SYSTOLIC BLOOD PRESSURE: 152 MMHG

## 2024-10-21 DIAGNOSIS — H92.01 RIGHT EAR PAIN: ICD-10-CM

## 2024-10-21 DIAGNOSIS — U07.1 COVID-19 VIRUS INFECTION: Primary | ICD-10-CM

## 2024-10-21 LAB
ALBUMIN SERPL-MCNC: 3.4 G/DL (ref 3.4–5)
ALBUMIN/GLOB SERPL: 0.9 (ref 0.8–1.7)
ALP SERPL-CCNC: 94 U/L (ref 45–117)
ALT SERPL-CCNC: 18 U/L (ref 13–56)
ANION GAP SERPL CALC-SCNC: 7 MMOL/L (ref 3–18)
AST SERPL-CCNC: 25 U/L (ref 10–38)
BASOPHILS # BLD: 0 K/UL (ref 0–0.1)
BASOPHILS NFR BLD: 0 % (ref 0–2)
BILIRUB SERPL-MCNC: 0.4 MG/DL (ref 0.2–1)
BUN SERPL-MCNC: 13 MG/DL (ref 7–18)
BUN/CREAT SERPL: 14 (ref 12–20)
CALCIUM SERPL-MCNC: 8.6 MG/DL (ref 8.5–10.1)
CHLORIDE SERPL-SCNC: 105 MMOL/L (ref 100–111)
CO2 SERPL-SCNC: 25 MMOL/L (ref 21–32)
CREAT SERPL-MCNC: 0.92 MG/DL (ref 0.6–1.3)
DIFFERENTIAL METHOD BLD: ABNORMAL
EOSINOPHIL # BLD: 0 K/UL (ref 0–0.4)
EOSINOPHIL NFR BLD: 0 % (ref 0–5)
ERYTHROCYTE [DISTWIDTH] IN BLOOD BY AUTOMATED COUNT: 13.1 % (ref 11.6–14.5)
FLUAV RNA SPEC QL NAA+PROBE: NOT DETECTED
FLUBV RNA SPEC QL NAA+PROBE: NOT DETECTED
GLOBULIN SER CALC-MCNC: 3.7 G/DL (ref 2–4)
GLUCOSE SERPL-MCNC: 92 MG/DL (ref 74–99)
HCT VFR BLD AUTO: 37.8 % (ref 35–45)
HGB BLD-MCNC: 12.5 G/DL (ref 12–16)
IMM GRANULOCYTES # BLD AUTO: 0 K/UL (ref 0–0.04)
IMM GRANULOCYTES NFR BLD AUTO: 0 % (ref 0–0.5)
LYMPHOCYTES # BLD: 0.8 K/UL (ref 0.9–3.6)
LYMPHOCYTES NFR BLD: 15 % (ref 21–52)
MCH RBC QN AUTO: 29.8 PG (ref 24–34)
MCHC RBC AUTO-ENTMCNC: 33.1 G/DL (ref 31–37)
MCV RBC AUTO: 90 FL (ref 78–100)
MONOCYTES # BLD: 0.5 K/UL (ref 0.05–1.2)
MONOCYTES NFR BLD: 10 % (ref 3–10)
NEUTS SEG # BLD: 3.7 K/UL (ref 1.8–8)
NEUTS SEG NFR BLD: 74 % (ref 40–73)
NRBC # BLD: 0 K/UL (ref 0–0.01)
NRBC BLD-RTO: 0 PER 100 WBC
PLATELET # BLD AUTO: 127 K/UL (ref 135–420)
PMV BLD AUTO: 12.2 FL (ref 9.2–11.8)
POTASSIUM SERPL-SCNC: 4.1 MMOL/L (ref 3.5–5.5)
PROT SERPL-MCNC: 7.1 G/DL (ref 6.4–8.2)
RBC # BLD AUTO: 4.2 M/UL (ref 4.2–5.3)
SARS-COV-2 RNA RESP QL NAA+PROBE: DETECTED
SODIUM SERPL-SCNC: 137 MMOL/L (ref 136–145)
SOURCE: ABNORMAL
WBC # BLD AUTO: 5 K/UL (ref 4.6–13.2)

## 2024-10-21 PROCEDURE — 71046 X-RAY EXAM CHEST 2 VIEWS: CPT

## 2024-10-21 PROCEDURE — 70450 CT HEAD/BRAIN W/O DYE: CPT

## 2024-10-21 PROCEDURE — 85025 COMPLETE CBC W/AUTO DIFF WBC: CPT

## 2024-10-21 PROCEDURE — 87636 SARSCOV2 & INF A&B AMP PRB: CPT

## 2024-10-21 PROCEDURE — 80053 COMPREHEN METABOLIC PANEL: CPT

## 2024-10-21 PROCEDURE — 99284 EMERGENCY DEPT VISIT MOD MDM: CPT

## 2024-10-21 PROCEDURE — 70480 CT ORBIT/EAR/FOSSA W/O DYE: CPT

## 2024-10-21 ASSESSMENT — ENCOUNTER SYMPTOMS
COUGH: 1
VOMITING: 0
SHORTNESS OF BREATH: 0
RHINORRHEA: 1
ABDOMINAL PAIN: 0
DIARRHEA: 0
SORE THROAT: 0

## 2024-10-21 NOTE — ED PROVIDER NOTES
Signs-Reviewed the patient's vital signs.       Records Reviewed: Personally, on initial evaluation    MDM:   DDX includes but is not limited to: Mass, Abscess, OM, OE, Cerumen impaction, COVID, Influenza, URI, PNA    Will obtain lab work and imaging for further evaluation of patients complaint. Will continue to monitor and evaluate patient while in the ED.      Orders as below:  Orders Placed This Encounter   Procedures    COVID-19 & Influenza Combo    CT HEAD WO CONTRAST    XR CHEST (2 VW)    CT IAC POSTERIOR FOSSA WO CONTRAST    CBC with Auto Differential    Comprehensive Metabolic Panel    Insert peripheral IV        Rose Agudelo presents with complaint of \"heartbeat\" in ears x 2 weeks.  On exam TMs clear bilaterally. Normal neuro exam. Patient states she was seen by ENT who is requesting imaging. Patient also complaining of rhinorrhea productive cough x 2 days. On exam lungs CTA. Normal oxygen saturation and respiratory rate.  Positive for COVID.  Chest x-ray shows no signs of pneumonia.  Discussed Paxlovid but patient and family decided against it. Discussed that at this time recommend treating her symptomatically and given very strict return precautions. CT scans show no acute findings.  Recommend continued outpatient ENT follow-up and given strict return precautions. At time of discharge, pt non-toxic appearing in NAD. Pt stable for prompt outpatient follow-up with PCP 1 to 2 days.  Patient given strict instructions to return if symptoms worsen.        ED Course:        Procedures:  Procedures       Documentation/Prior Results Review:  Old medical records.  Nursing notes.      Diagnosis and Disposition     CLINICAL IMPRESSION:  1. COVID-19 virus infection    2. Right ear pain         Medication List        ASK your doctor about these medications      B-12 1000 MCG Caps     Cholecalciferol 50 MCG (2000 UT) Tabs     ONE-A-DAY WOMENS PO              Disposition: Discharged    Patient condition at time of

## 2024-10-21 NOTE — ED TRIAGE NOTES
Ambulatory pt c/o hearing her heart beat in her ear x 2 weeks, posterior headache that radiates to the front of her head x 2 days, productive (clear) cough x 2 days. Pt reports being seen by ENT 2 weeks ago and having ears irrigated

## 2024-10-21 NOTE — ED NOTES
Pt updated on status of care and pending CT results. Informed that CT's take anywhere between 90 minutes to 3 hours to result. Pt and family verbalized understanding

## 2024-11-19 ENCOUNTER — OFFICE VISIT (OUTPATIENT)
Age: 79
End: 2024-11-19

## 2024-11-19 VITALS
BODY MASS INDEX: 29.08 KG/M2 | DIASTOLIC BLOOD PRESSURE: 88 MMHG | WEIGHT: 158 LBS | SYSTOLIC BLOOD PRESSURE: 138 MMHG | OXYGEN SATURATION: 98 % | HEART RATE: 72 BPM | HEIGHT: 62 IN

## 2024-11-19 DIAGNOSIS — R01.1 MURMUR: Primary | ICD-10-CM

## 2024-11-19 DIAGNOSIS — R00.2 PALPITATIONS: ICD-10-CM

## 2024-11-19 DIAGNOSIS — I10 PRIMARY HYPERTENSION: ICD-10-CM

## 2024-11-19 PROBLEM — M19.90 ARTHRITIS: Status: ACTIVE | Noted: 2023-07-05

## 2024-11-19 PROBLEM — E11.9 DIABETES (HCC): Status: ACTIVE | Noted: 2024-11-19

## 2024-11-19 PROBLEM — E11.9 TYPE 2 DIABETES MELLITUS (HCC): Status: ACTIVE | Noted: 2023-07-05

## 2024-11-19 ASSESSMENT — PATIENT HEALTH QUESTIONNAIRE - PHQ9
SUM OF ALL RESPONSES TO PHQ QUESTIONS 1-9: 0
2. FEELING DOWN, DEPRESSED OR HOPELESS: NOT AT ALL
1. LITTLE INTEREST OR PLEASURE IN DOING THINGS: NOT AT ALL
SUM OF ALL RESPONSES TO PHQ9 QUESTIONS 1 & 2: 0
SUM OF ALL RESPONSES TO PHQ QUESTIONS 1-9: 0

## 2024-11-19 ASSESSMENT — ANXIETY QUESTIONNAIRES
7. FEELING AFRAID AS IF SOMETHING AWFUL MIGHT HAPPEN: NOT AT ALL
GAD7 TOTAL SCORE: 0
6. BECOMING EASILY ANNOYED OR IRRITABLE: NOT AT ALL
1. FEELING NERVOUS, ANXIOUS, OR ON EDGE: NOT AT ALL
3. WORRYING TOO MUCH ABOUT DIFFERENT THINGS: NOT AT ALL
5. BEING SO RESTLESS THAT IT IS HARD TO SIT STILL: NOT AT ALL
2. NOT BEING ABLE TO STOP OR CONTROL WORRYING: NOT AT ALL
4. TROUBLE RELAXING: NOT AT ALL

## 2024-11-19 ASSESSMENT — ENCOUNTER SYMPTOMS
ABDOMINAL DISTENTION: 0
SHORTNESS OF BREATH: 0
VOMITING: 0
ABDOMINAL PAIN: 0
NAUSEA: 0
COUGH: 0
SORE THROAT: 0

## 2024-11-19 NOTE — PROGRESS NOTES
Rose Agudelo presents today for   Chief Complaint   Patient presents with    Follow-up     1 year       Roseflorecita Zunigad preferred language for health care discussion is english/other.    Is someone accompanying this pt? no    Is the patient using any DME equipment during OV? no    Depression Screening:  Depression: Not at risk (11/19/2024)    PHQ-2     PHQ-2 Score: 0        Learning Assessment:  Who is the primary learner? Patient    What is the preferred language for health care of the primary learner? ENGLISH    How does the primary learner prefer to learn new concepts? DEMONSTRATION    Answered By patient    Relationship to Learner SELF           Pt currently taking Anticoagulant therapy? no    Pt currently taking Antiplatelet therapy ? no      Coordination of Care:  1. Have you been to the ER, urgent care clinic since your last visit? Hospitalized since your last visit? no    2. Have you seen or consulted any other health care providers outside of the [facility] since your last visit? Include any pap smears or colon screening. no    
current facility-administered medications for this visit.     No Known Allergies  Social History     Tobacco Use    Smoking status: Never   Vaping Use    Vaping status: Never Used   Substance Use Topics    Alcohol use: No    Drug use: Never     Family History   Problem Relation Age of Onset    Osteoarthritis Mother     Heart Disease Mother     Heart Disease Father     Dementia Father     Cancer Father     Diabetes Sister     Heart Disease Sister     Diabetes Brother     No Known Problems Maternal Grandmother     No Known Problems Maternal Grandfather     No Known Problems Paternal Grandmother     No Known Problems Paternal Grandfather     No Known Problems Other          Review of Systems   Constitutional:  Negative for chills, fatigue and fever.   HENT:  Negative for congestion, hearing loss, nosebleeds and sore throat.    Eyes:  Negative for visual disturbance.   Respiratory:  Negative for cough and shortness of breath.    Cardiovascular:  Negative for chest pain, palpitations and leg swelling.   Gastrointestinal:  Negative for abdominal distention, abdominal pain, nausea and vomiting.   Endocrine: Negative for cold intolerance and heat intolerance.   Genitourinary:  Negative for dysuria.   Musculoskeletal:  Negative for arthralgias.   Skin:  Negative for rash.   Neurological:  Negative for dizziness, syncope, weakness and headaches.   Hematological:  Does not bruise/bleed easily.   Psychiatric/Behavioral:  Negative for suicidal ideas.          /88 (Site: Left Upper Arm, Position: Sitting, Cuff Size: Medium Adult)   Pulse 72   Ht 1.575 m (5' 2\")   Wt 71.7 kg (158 lb)   SpO2 98%   BMI 28.90 kg/m²     Objective:   Physical Exam  Constitutional:       General: She is not in acute distress.  HENT:      Head: Normocephalic.   Neck:      Vascular: No carotid bruit or JVD.   Cardiovascular:      Rate and Rhythm: Normal rate and regular rhythm. No extrasystoles are present.     Heart sounds: Murmur heard.